# Patient Record
Sex: MALE | Race: NATIVE HAWAIIAN OR OTHER PACIFIC ISLANDER
[De-identification: names, ages, dates, MRNs, and addresses within clinical notes are randomized per-mention and may not be internally consistent; named-entity substitution may affect disease eponyms.]

---

## 2017-07-31 ENCOUNTER — HOSPITAL ENCOUNTER (EMERGENCY)
Dept: HOSPITAL 47 - EC | Age: 37
Discharge: HOME | End: 2017-07-31
Payer: COMMERCIAL

## 2017-07-31 VITALS
SYSTOLIC BLOOD PRESSURE: 135 MMHG | RESPIRATION RATE: 18 BRPM | DIASTOLIC BLOOD PRESSURE: 89 MMHG | HEART RATE: 86 BPM | TEMPERATURE: 97.1 F

## 2017-07-31 DIAGNOSIS — Z23: ICD-10-CM
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
## 2017-07-31 DIAGNOSIS — S61.213A: ICD-10-CM

## 2017-07-31 DIAGNOSIS — S61.211A: Primary | ICD-10-CM
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
## 2017-07-31 DIAGNOSIS — F17.200: ICD-10-CM

## 2017-07-31 PROCEDURE — 12001 RPR S/N/AX/GEN/TRNK 2.5CM/<: CPT

## 2017-07-31 PROCEDURE — 90471 IMMUNIZATION ADMIN: CPT

## 2017-07-31 PROCEDURE — 99283 EMERGENCY DEPT VISIT LOW MDM: CPT

## 2017-07-31 PROCEDURE — 90715 TDAP VACCINE 7 YRS/> IM: CPT

## 2017-07-31 PROCEDURE — 99282 EMERGENCY DEPT VISIT SF MDM: CPT

## 2017-07-31 NOTE — ED
Wound/Laceration HPI





- General


Chief Complaint: Wound/Laceration


Stated Complaint: Lac/Hand


Time Seen by Provider: 07/31/17 16:05


Source: patient, RN notes reviewed, old records reviewed


Mode of arrival: ambulatory


Limitations: no limitations





- History of Present Illness


Initial Comments: 





This is a 36 year old male with CC of left hand laceration. Patient reports he 

was at home and working with hay bails. Patient reports that he was putting a 

bail on a conveyor belt, and his glove got caught. Patient reports that he has 

laceration over 2nd and 3rd proximal knuckles. Patient does not know his status 

of his tetanus vaccine. Patient reports full range of motion of his fingers. 





- Related Data


 Previous Rx's











 Medication  Instructions  Recorded


 


Cephalexin [Keflex] 500 mg PO Q8HR #21 cap 07/31/17











 Allergies











Allergy/AdvReac Type Severity Reaction Status Date / Time


 


No Known Allergies Allergy   Verified 07/31/17 15:14














Review of Systems


ROS Statement: 


Those systems with pertinent positive or pertinent negative responses have been 

documented in the HPI.





ROS Other: All systems not noted in ROS Statement are negative.





Past Medical History


Past Medical History: Osteoarthritis (OA)


Additional Past Medical History / Comment(s): glaucoma


History of Any Multi-Drug Resistant Organisms: None Reported


Past Surgical History: No Surgical Hx Reported


Smoking Status: Current every day smoker


Past Alcohol Use History: None Reported


Past Drug Use History: Marijuana





General Exam





- General Exam Comments


Initial Comments: 





Well appearing 36 year old male, no distress. 


Limitations: no limitations


General appearance: alert, in no apparent distress


Head exam: Present: atraumatic, normocephalic, normal inspection


Eye exam: Present: normal appearance, PERRL, EOMI.  Absent: scleral icterus, 

conjunctival injection, periorbital swelling


ENT exam: Present: normal exam, mucous membranes moist


Neck exam: Present: normal inspection.  Absent: tenderness, meningismus, 

lymphadenopathy


Respiratory exam: Present: normal lung sounds bilaterally.  Absent: respiratory 

distress, wheezes, rales, rhonchi, stridor


Cardiovascular Exam: Present: regular rate, normal rhythm, normal heart sounds.

  Absent: systolic murmur, diastolic murmur, rubs, gallop, clicks


GI/Abdominal exam: Present: soft, normal bowel sounds.  Absent: distended, 

tenderness, guarding, rebound, rigid


Extremities exam: Present: normal inspection, full ROM, normal capillary refill

, other (left 2nd nad 3rd finger proximal interphalangeal joint laceration. 

EAch measure 1.5 cm. Patient has full range of motion, no tendon involvment. ).

  Absent: tenderness, pedal edema, joint swelling, calf tenderness


Back exam: Present: normal inspection


Neurological exam: Present: alert, oriented X3, CN II-XII intact


Psychiatric exam: Present: normal affect, normal mood





Course


 Vital Signs











  07/31/17





  15:08


 


Temperature 97.1 F L


 


Pulse Rate 86


 


Respiratory 18





Rate 


 


Blood Pressure 135/89


 


O2 Sat by Pulse 99





Oximetry 














Procedures





- Laceration


  ** Laceration #1


Indication: laceration


Site: hand (left 2nd finger )


Size (cm): 1


Description: linear


Depth: simple, single layer


Anesthetic Used: lidocaine 1%


Anesthesia Technique: local infiltration


Amount (mls): 2


Pre-repair: wound explored, irrigated extensively


Type of Sutures: nylon


Size of Sutures: 5-0


Number of Sutures: 4


Technique: simple, interrupted


Patient Tolerated Procedure: well





  ** Laceration #2


Site: hand (left 3rd finger)


Size (cm): 1


Description: flap


Depth: simple, single layer


Anesthetic Used: lidocaine 1%


Anesthesia Technique: local infiltration


Amount (mls): 2


Pre-repair: wound explored


Type of Sutures: nylon


Size of Sutures: 5-0


Number of Sutures: 4


Technique: simple, interrupted


Patient Tolerated Procedure: well, no complications





Medical Decision Making





- Medical Decision Making


This is a 36 year old male with CC of left hand laceration. Patient reports he 

was at home and working with hay bails. Patient reports that he was putting a 

bail on a conveyor belt, and his glove got caught. Patient reports that he has 

laceration over 2nd and 3rd proximal knuckles. Patient and was soaked in soap 

water and betadine. Patient has full range of motion and sensation. Xray show 

no acute abnormality. Patient received a total of 7 sutures between both 

fingers. Discussed monitor for infection. REturn parameters discussed. Given 

tetanus and keflex. 








- Radiology Data


Radiology results: report reviewed


No acute osseous abnormality. 





Disposition


Clinical Impression: 


 Finger laceration





Disposition: HOME SELF-CARE


Condition: Good


Instructions:  Finger Laceration (ED)


Additional Instructions: 


Please return to the emergency room in 8-10 days to have sutures removed. 

Please leave wound covered for the first 24-48 hours and then leave open to air 

after that time. Please use clean soap and water to clean the suture area to 

prevent scabbing over the top of your sutures. Please watch for any signs of 

infection which may include but not limited to increased pain, swelling, redness

, fever or chills. Please return to the emergency room if any signs of 

infection do occur. Please return to the emergency room for any other concerns 

or complications. 


Prescriptions: 


Cephalexin [Keflex] 500 mg PO Q8HR #21 cap


Referrals: 


None,Stated [Primary Care Provider] - 1-2 days


Sandra Zimmer MD [STAFF PHYSICIAN] - 1-2 days


Time of Disposition: 17:28

## 2017-07-31 NOTE — XR
EXAMINATION TYPE: XR hand complete LT

 

DATE OF EXAM: 7/31/2017

 

COMPARISON: NONE

 

HISTORY: Pain

 

TECHNIQUE: 3 views

 

FINDINGS: I see no fracture nor dislocation. Metacarpals are intact. There are no erosions.

 

IMPRESSION: Negative left hand exam. There is a small 5 mm exostosis noted on the head of the first m
etacarpal that is probably not of clinical significance.

## 2017-08-02 NOTE — CDI
Documentation Clarification OP 



Dear Dr. ANGEL Andrew 



Please do addendum to ED report that provides Lacerartion length and repair 
procedure.



Thank you, 

CARLOTTA Hudson 

 

If you have any questions, please contact  at [phone] 
[facility]

## 2018-11-22 ENCOUNTER — HOSPITAL ENCOUNTER (EMERGENCY)
Dept: HOSPITAL 47 - EC | Age: 38
Discharge: HOME | End: 2018-11-22
Payer: COMMERCIAL

## 2018-11-22 VITALS — RESPIRATION RATE: 18 BRPM

## 2018-11-22 VITALS — DIASTOLIC BLOOD PRESSURE: 64 MMHG | HEART RATE: 77 BPM | SYSTOLIC BLOOD PRESSURE: 113 MMHG | TEMPERATURE: 98.8 F

## 2018-11-22 DIAGNOSIS — F17.200: ICD-10-CM

## 2018-11-22 DIAGNOSIS — J02.0: Primary | ICD-10-CM

## 2018-11-22 DIAGNOSIS — B95.1: ICD-10-CM

## 2018-11-22 PROCEDURE — 99283 EMERGENCY DEPT VISIT LOW MDM: CPT

## 2018-11-22 PROCEDURE — 36415 COLL VENOUS BLD VENIPUNCTURE: CPT

## 2018-11-22 PROCEDURE — 86308 HETEROPHILE ANTIBODY SCREEN: CPT

## 2018-11-22 PROCEDURE — 87430 STREP A AG IA: CPT

## 2018-11-22 PROCEDURE — 87502 INFLUENZA DNA AMP PROBE: CPT

## 2018-11-22 NOTE — ED
ENT HPI





- General


Chief complaint: ENT


Stated complaint: SORE THROAT, BODY ACHES


Time Seen by Provider: 11/22/18 11:56


Source: patient


Mode of arrival: ambulatory


Limitations: no limitations





- History of Present Illness


Initial comments: 


37-year-old male with no past medical history presenting today for chief 

complaint of sore throat, body aches and fever.  Patient states his son had 

recently been diagnosed with strep.  Patient states the first day of illness 

about 2 days ago he had bodyaches, tender anterior neck denies any abdominal 

pain, coughing, nausea, vomiting diarrhea that this or photophobia.  Patient 

states that the following day he began experiencing severe sore throat, pain 

with swallowing.  Denies any difficulty breathing, sensation of throat 

tightness or neck swelling.  Patient states that he has been febrile, does not 

know temperature however he states he felt warm and had chills.  She has been 

taking ibuprofen and Tylenol for pain management.  Upon arrival patient vital 

signs stable, he appears as though he feels unwell however nontoxic.  Remainder 

of ROS negative, patient denies any recent shortness of breath, chest pain, 

back pain, numbness or tingling, dysuria or hematuria, constipation or diarrhea

, headaches or visual changes, or any other complaints. 








- Related Data


 Home Medications











 Medication  Instructions  Recorded  Confirmed


 


Naproxen 500 mg PO BID PRN 11/22/18 11/22/18








 Previous Rx's











 Medication  Instructions  Recorded


 


Amoxicillin 500 mg PO Q12HR 10 Days #20 cap 11/22/18


 


predniSONE 20 mg PO DAILY 3 Days #3 tab 11/22/18











 Allergies











Allergy/AdvReac Type Severity Reaction Status Date / Time


 


No Known Allergies Allergy   Verified 11/22/18 11:33














Review of Systems


ROS Statement: 


Those systems with pertinent positive or pertinent negative responses have been 

documented in the HPI.





ROS Other: All systems not noted in ROS Statement are negative.


Constitutional: Reports: fever, chills


ENT: Reports: throat pain


Respiratory: Denies: cough, dyspnea, wheezes, hemoptysis, stridor


Cardiovascular: Denies: chest pain, palpitations


Gastrointestinal: Denies: abdominal pain, nausea, vomiting, diarrhea, 

constipation, hematemesis, melena


Genitourinary: Denies: urgency, dysuria, frequency, hematuria, discharge


Musculoskeletal: Denies: back pain


Skin: Denies: rash





Past Medical History


Past Medical History: Osteoarthritis (OA)


Additional Past Medical History / Comment(s): glaucoma


History of Any Multi-Drug Resistant Organisms: None Reported


Past Surgical History: No Surgical Hx Reported


Past Psychological History: No Psychological Hx Reported


Smoking Status: Current every day smoker


Past Alcohol Use History: None Reported


Past Drug Use History: Marijuana





General Exam





- General Exam Comments


Initial Comments: 


General:  The patient is awake and alert, in no distress, she does appear as 

though he feels unwell however nontoxic.


Eye:  Pupils are equal, round and reactive to light, extra-ocular movements are 

intact.  No nystagmus.  There is normal conjunctiva bilaterally.  No signs of 

icterus.  


Ears, nose, mouth and throat:  There are moist mucous membranes and no oral 

lesions.  Oropharynx is erythematous, tonsils erythematous and enlarged with 

tonsillar crypts.  Uvula is midline.  Tympanic membranes within normal limits 

bilaterally


Neck:  The neck is supple, there is no tenderness or JVD.  Tender anterior 

cervical lymphadenopathy


Cardiovascular:  There is a regular rate and rhythm. No murmur, rub or gallop 

is appreciated.


Respiratory:  Lungs are clear to auscultation, respirations are non-labored, 

breath sounds are equal.  No wheezes, stridor, rales, or rhonchi.


Gastrointestinal: Soft, nontender abdomen.  No tenderness to deep and light 

palpation.  Bowel sounds within normal limits to auscultation.


Musculoskeletal:  Normal ROM, no tenderness.  Strength 5/5. Sensation intact. 

Pulses equal bilaterally 2+.  


Neurological:  A&O x 3. CN II-XII intact, There are no obvious motor or sensory 

deficits. Coordination appears grossly intact. Speech is normal.


Skin:  Skin is warm and dry and no rashes or lesions are noted. 


Psychiatric:  Cooperative, appropriate mood & affect, normal judgment.  





Limitations: no limitations





Course


 Vital Signs











  11/22/18 11/22/18





  11:20 13:30


 


Temperature 99.1 F 98.8 F


 


Pulse Rate 80 77


 


Respiratory 18 18





Rate  


 


Blood Pressure 107/71 113/64


 


O2 Sat by Pulse 97 97





Oximetry  














Medical Decision Making





- Medical Decision Making


Pt appears nontoxic.  Physical exam concerning for strep pharyngitis.  Pt given 

10mg PO dexamethasone and tylenol. Group a strep positive, heterophile 

negative.  No signs of respiratory compromise.  No signs concerning for 

peritonsillar abscess at this time.  No hot potato voice. At this time feel 

patient is stable for discharge with prescription for amoxicillin for treatment 

of strep pharyngitis as well as prednisone 20 mg daily times 3 days. Pt is to 

follow-up with primary care provider in one to 2 days.  Return parameters were 

discussed at length, patient verbalizes understanding.  Case discussed with Dr. Malik, at this time we feel pt is stable for discharge with abx and f/u. Pt 

agreeable with plan. Discharged in stable condition.








- Lab Data


 Lab Results











  11/22/18 11/22/18 11/22/18 Range/Units





  12:11 12:19 12:19 


 


Heterophile Antibody   Negative   (Negative)  


 


Influenza Type A RNA    Not Detected  (Not Detectd)  


 


Influenza Type B (PCR)    Not Detected  (Not Detectd)  


 


Group A Strep Rapid  Positive A    (Negative)  














Disposition


Clinical Impression: 


 Group beta Strep positive, Strep pharyngitis





Disposition: HOME SELF-CARE


Condition: Good


Instructions:  Strep Throat (ED)


Additional Instructions: 


Please use medication as discussed.  Please follow-up with family doctor in the 

next 2 days.  Please return to emergency room if the symptoms increase or 

worsen or for any other concerns, including neck swelling, difficulty breathing 

or swallowing.


Prescriptions: 


Amoxicillin 500 mg PO Q12HR 10 Days #20 cap


predniSONE 20 mg PO DAILY 3 Days #3 tab


Is patient prescribed a controlled substance at d/c from ED?: No


Referrals: 


Anton Rios Jr,  [Primary Care Provider] - 1-2 days


Time of Disposition: 13:12

## 2019-12-11 ENCOUNTER — HOSPITAL ENCOUNTER (EMERGENCY)
Dept: HOSPITAL 47 - EC | Age: 39
Discharge: HOME | End: 2019-12-11
Payer: COMMERCIAL

## 2019-12-11 VITALS — HEART RATE: 54 BPM | DIASTOLIC BLOOD PRESSURE: 70 MMHG | TEMPERATURE: 97.9 F | SYSTOLIC BLOOD PRESSURE: 104 MMHG

## 2019-12-11 VITALS — RESPIRATION RATE: 18 BRPM

## 2019-12-11 DIAGNOSIS — S61.531A: Primary | ICD-10-CM
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
## 2019-12-11 DIAGNOSIS — M19.90: ICD-10-CM

## 2019-12-11 DIAGNOSIS — H40.9: ICD-10-CM
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
## 2019-12-11 DIAGNOSIS — Z87.891: ICD-10-CM

## 2019-12-11 PROCEDURE — 99283 EMERGENCY DEPT VISIT LOW MDM: CPT

## 2019-12-11 NOTE — ED
General Adult HPI





- General


Chief complaint: Extremity Injury, Upper


Stated complaint: nail gun injury right hand


Time Seen by Provider: 12/11/19 15:11


Source: patient


Mode of arrival: ambulatory


Limitations: no limitations





- History of Present Illness


Initial comments: 





Patient is a 39-year-old male presenting to the emergency Department with 

complaints of right wrist pain 2 days.  Patient states he was working in his 

basement and accidentally shot himself with a nail gun in the right wrist.  

Patient states the nail was approximately 3 inches in length and states only 

about an inch was sticking out of his skin.  Patient states he immediately 

pulled out the nail.  He does not remember if it came out easily or there was a 

lot of resistance.  It happened so fast.  Patient states the wound did bleed 

quite a bit for the first few minutes.  He denies being on blood thinners.  

Patient states he had a tetanus vaccine 2 years ago after receiving stitches.  

Patient states he has been having increase in pain and swelling of his right 

wrist and into his right hand and forearm.  Patient states he is not able to 

fully extend or flex his fingers without pain.  Patient denies any other history

of trauma or injuries to his right hand or wrist.  Patient has no other 

complaints at this time.  Upon arrival to the ER, vital signs are stable.





- Related Data


                                Home Medications











 Medication  Instructions  Recorded  Confirmed


 


Naproxen 500 mg PO BID PRN 11/22/18 11/22/18








                                  Previous Rx's











 Medication  Instructions  Recorded


 


Amoxicillin 500 mg PO Q12HR 10 Days #20 cap 11/22/18


 


predniSONE 20 mg PO DAILY 3 Days #3 tab 11/22/18


 


Cephalexin [Keflex] 500 mg PO Q6HR 7 Days #28 cap 12/11/19











                                    Allergies











Allergy/AdvReac Type Severity Reaction Status Date / Time


 


No Known Allergies Allergy   Verified 11/22/18 11:33














Review of Systems


ROS Statement: 


Those systems with pertinent positive or pertinent negative responses have been 

documented in the HPI.





ROS Other: All systems not noted in ROS Statement are negative.





Past Medical History


Past Medical History: Osteoarthritis (OA)


Additional Past Medical History / Comment(s): glaucoma


History of Any Multi-Drug Resistant Organisms: None Reported


Past Surgical History: No Surgical Hx Reported


Past Psychological History: No Psychological Hx Reported


Smoking Status: Former smoker


Past Alcohol Use History: None Reported


Past Drug Use History: Marijuana





General Exam





- General Exam Comments


Initial Comments: 





GENERAL: 


Well-appearing, well-nourished and in no acute distress.





HEAD: 


Atraumatic, normocephalic.





EYES:


Pupils equal round and reactive to light, extraocular movements intact, sclera 

anicteric, conjunctiva are normal.





ENT: 


Nares patent, oropharynx clear without exudates.  Moist mucous membranes.





NECK: 


Normal range of motion, supple without lymphadenopathy or JVD.





LUNGS:


 Breath sounds clear to auscultation bilaterally and equal.  No wheezes rales or

 rhonchi.





HEART:


Regular rate and rhythm without murmurs, rubs or gallops.





EXTREMITIES: 


Pain with palpation of the right wrist, forearm, palmar aspect.  Patient has 

significant pain with right wrist range of motion.  Patient is able to pronate 

and supinate slightly.  Patient is not able to fully extend his fingers 

secondary to pain.  Patient does not have opposition of the thumb to the pinky. 

 Patient is neurovascular intact.





NEUROLOGICAL: 


Cranial nerves II through XII grossly intact.  Normal speech, normal gait.





PSYCH:


Normal mood, normal affect.





SKIN:


 Warm, Dry, normal turgor, no rashes.  Patient has a single puncture wound to 

the lateral aspect of the right wrist.  There is some mild swelling around the 

area.  There is no overlying erythema.


Limitations: no limitations





Course


                                   Vital Signs











  12/11/19





  15:06


 


Temperature 98 F


 


Pulse Rate 58 L


 


Respiratory 18





Rate 


 


Blood Pressure 118/70


 


O2 Sat by Pulse 99





Oximetry 














Medical Decision Making





- Medical Decision Making





Patient is a 3-year-old male presenting with right wrist pain after shooting 

himself with a nail gun 2 days ago.  Patient's tetanus is up-to-date.  X-rays 

reveal no acute fractures, dislocations or foreign bodies.  Patient is able to 

move around his wrist and fingers however is very painful.  I discussed these 

findings with the patient and he will follow up with orthopedics if symptoms 

persist after one week.  Patient will be started on Keflex for infection 

prevention.  Patient is stable for discharge at this time and he is in agreement

 with this plan of care.  Return parameters were discussed with the patient and 

he verbalized understanding.  Case discussed with Dr. Andrew.





Disposition


Clinical Impression: 


 Puncture wound of right wrist





Disposition: HOME SELF-CARE


Condition: Stable


Instructions (If sedation given, give patient instructions):  Puncture Wound 

(ED)


Additional Instructions: 


Please return to the Emergency Department if symptoms worsen or any other 

concerns.


Take antibiotic as prescribed


Follow up with orthopedics if symptoms persist after one week.


Prescriptions: 


Cephalexin [Keflex] 500 mg PO Q6HR 7 Days #28 cap


Is patient prescribed a controlled substance at d/c from ED?: No


Referrals: 


Anton Rios Jr, DO [Primary Care Provider] - 1-2 days


Duarte Stearns, PAC [PHYSICIAN ASSISTANT] - 1-2 days

## 2019-12-11 NOTE — XR
EXAMINATION TYPE: XR wrist complete RT

 

DATE OF EXAM: 12/11/2019

 

CLINICAL HISTORY: Right wrist pain. Puncture wound of the radial aspect of the right wrist appear a l
ittle injury

 

TECHNIQUE:  Frontal, lateral and oblique images of the right wrist are obtained. Scaphoid view was al
so obtained.

 

COMPARISON: None

 

FINDINGS:  There is no acute fracture/dislocation evident in the right wrist.  The joint spaces in th
e right wrist appear within normal limits.  The overlying soft tissue appears unremarkable. No radiop
aque foreign body.

 

IMPRESSION:  There is no acute fracture or dislocation in the right wrist. No osseous laceration or r
adiopaque foreign body.